# Patient Record
Sex: FEMALE | Race: WHITE | NOT HISPANIC OR LATINO | Employment: FULL TIME | URBAN - METROPOLITAN AREA
[De-identification: names, ages, dates, MRNs, and addresses within clinical notes are randomized per-mention and may not be internally consistent; named-entity substitution may affect disease eponyms.]

---

## 2017-04-14 ENCOUNTER — TELEPHONE (OUTPATIENT)
Dept: OBGYN | Age: 27
End: 2017-04-14

## 2018-09-14 LAB — CYTOLOGY CVX/VAG DOC THIN PREP: NORMAL

## 2022-02-03 ENCOUNTER — LAB SERVICES (OUTPATIENT)
Dept: LAB | Age: 32
End: 2022-02-03
Attending: FAMILY MEDICINE

## 2022-02-03 ENCOUNTER — OFFICE VISIT (OUTPATIENT)
Dept: FAMILY MEDICINE | Age: 32
End: 2022-02-03
Attending: FAMILY MEDICINE

## 2022-02-03 VITALS
HEART RATE: 89 BPM | OXYGEN SATURATION: 99 % | BODY MASS INDEX: 44.46 KG/M2 | SYSTOLIC BLOOD PRESSURE: 124 MMHG | DIASTOLIC BLOOD PRESSURE: 86 MMHG | RESPIRATION RATE: 16 BRPM | WEIGHT: 283.29 LBS | HEIGHT: 67 IN | TEMPERATURE: 96.6 F

## 2022-02-03 DIAGNOSIS — Z76.89 ESTABLISHING CARE WITH NEW DOCTOR, ENCOUNTER FOR: Primary | ICD-10-CM

## 2022-02-03 DIAGNOSIS — Z76.89 ESTABLISHING CARE WITH NEW DOCTOR, ENCOUNTER FOR: ICD-10-CM

## 2022-02-03 DIAGNOSIS — Z00.00 PHYSICAL EXAM, ANNUAL: ICD-10-CM

## 2022-02-03 LAB
ALBUMIN SERPL-MCNC: 3.8 G/DL (ref 3.6–5.1)
ALBUMIN/GLOB SERPL: 0.9 {RATIO} (ref 1–2.4)
ALP SERPL-CCNC: 67 UNITS/L (ref 45–117)
ALT SERPL-CCNC: 30 UNITS/L
ANION GAP SERPL CALC-SCNC: 8 MMOL/L (ref 10–20)
AST SERPL-CCNC: 19 UNITS/L
BILIRUB SERPL-MCNC: 0.4 MG/DL (ref 0.2–1)
BUN SERPL-MCNC: 10 MG/DL (ref 6–20)
BUN/CREAT SERPL: 11 (ref 7–25)
CALCIUM SERPL-MCNC: 9 MG/DL (ref 8.4–10.2)
CHLORIDE SERPL-SCNC: 108 MMOL/L (ref 98–107)
CHOLEST SERPL-MCNC: 154 MG/DL
CHOLEST/HDLC SERPL: 3.2 {RATIO}
CO2 SERPL-SCNC: 26 MMOL/L (ref 21–32)
CREAT SERPL-MCNC: 0.89 MG/DL (ref 0.51–0.95)
FASTING DURATION TIME PATIENT: ABNORMAL H
FASTING DURATION TIME PATIENT: ABNORMAL H
GFR SERPLBLD BASED ON 1.73 SQ M-ARVRAT: 87 ML/MIN
GLOBULIN SER-MCNC: 4.4 G/DL (ref 2–4)
GLUCOSE SERPL-MCNC: 85 MG/DL (ref 70–99)
HBA1C MFR BLD: 5.3 % (ref 4.5–5.6)
HDLC SERPL-MCNC: 48 MG/DL
LDLC SERPL CALC-MCNC: 77 MG/DL
NONHDLC SERPL-MCNC: 106 MG/DL
POTASSIUM SERPL-SCNC: 4.1 MMOL/L (ref 3.4–5.1)
PROT SERPL-MCNC: 8.2 G/DL (ref 6.4–8.2)
SODIUM SERPL-SCNC: 138 MMOL/L (ref 135–145)
TRIGL SERPL-MCNC: 145 MG/DL

## 2022-02-03 PROCEDURE — 99202 OFFICE O/P NEW SF 15 MIN: CPT

## 2022-02-03 PROCEDURE — 83036 HEMOGLOBIN GLYCOSYLATED A1C: CPT

## 2022-02-03 PROCEDURE — 84443 ASSAY THYROID STIM HORMONE: CPT

## 2022-02-03 PROCEDURE — 80061 LIPID PANEL: CPT

## 2022-02-03 PROCEDURE — 80053 COMPREHEN METABOLIC PANEL: CPT

## 2022-02-03 PROCEDURE — 99395 PREV VISIT EST AGE 18-39: CPT | Performed by: STUDENT IN AN ORGANIZED HEALTH CARE EDUCATION/TRAINING PROGRAM

## 2022-02-03 PROCEDURE — 36415 COLL VENOUS BLD VENIPUNCTURE: CPT

## 2022-02-03 ASSESSMENT — PATIENT HEALTH QUESTIONNAIRE - PHQ9
CLINICAL INTERPRETATION OF PHQ2 SCORE: NO FURTHER SCREENING NEEDED
SUM OF ALL RESPONSES TO PHQ9 QUESTIONS 1 AND 2: 0
1. LITTLE INTEREST OR PLEASURE IN DOING THINGS: NOT AT ALL
2. FEELING DOWN, DEPRESSED OR HOPELESS: NOT AT ALL
SUM OF ALL RESPONSES TO PHQ9 QUESTIONS 1 AND 2: 0

## 2022-02-04 LAB — TSH SERPL-ACNC: 1.53 MCUNITS/ML (ref 0.35–5)

## 2022-02-09 ENCOUNTER — TELEPHONE (OUTPATIENT)
Dept: TELEHEALTH | Age: 32
End: 2022-02-09

## 2022-03-08 ENCOUNTER — CLINICAL ABSTRACT (OUTPATIENT)
Dept: OTHER | Age: 32
End: 2022-03-08

## 2024-04-01 ENCOUNTER — OFFICE VISIT (OUTPATIENT)
Dept: OBGYN CLINIC | Facility: CLINIC | Age: 34
End: 2024-04-01
Payer: COMMERCIAL

## 2024-04-01 VITALS — SYSTOLIC BLOOD PRESSURE: 114 MMHG | DIASTOLIC BLOOD PRESSURE: 72 MMHG | HEART RATE: 99 BPM | WEIGHT: 276.25 LBS

## 2024-04-01 DIAGNOSIS — R87.610 ASCUS WITH POSITIVE HIGH RISK HPV CERVICAL: ICD-10-CM

## 2024-04-01 DIAGNOSIS — Z12.4 SCREENING FOR CERVICAL CANCER: ICD-10-CM

## 2024-04-01 DIAGNOSIS — N93.9 ABNORMAL UTERINE BLEEDING: Primary | ICD-10-CM

## 2024-04-01 DIAGNOSIS — N93.0 PCB (POST COITAL BLEEDING): ICD-10-CM

## 2024-04-01 DIAGNOSIS — N80.9 ENDOMETRIOSIS: ICD-10-CM

## 2024-04-01 DIAGNOSIS — R87.810 ASCUS WITH POSITIVE HIGH RISK HPV CERVICAL: ICD-10-CM

## 2024-04-01 PROCEDURE — 87624 HPV HI-RISK TYP POOLED RSLT: CPT | Performed by: OBSTETRICS & GYNECOLOGY

## 2024-04-01 RX ORDER — ALBUTEROL SULFATE 90 UG/1
AEROSOL, METERED RESPIRATORY (INHALATION)
COMMUNITY
Start: 2024-03-05

## 2024-04-01 RX ORDER — LEVONORGESTREL AND ETHINYL ESTRADIOL 100-20(84)
1 KIT ORAL DAILY
Qty: 91 TABLET | Refills: 1 | Status: SHIPPED | OUTPATIENT
Start: 2024-04-01 | End: 2025-04-01

## 2024-04-01 RX ORDER — AZELASTINE 1 MG/ML
SPRAY, METERED NASAL
COMMUNITY
Start: 2024-03-05

## 2024-04-01 RX ORDER — BENZONATATE 100 MG/1
100 CAPSULE ORAL EVERY 8 HOURS PRN
COMMUNITY
Start: 2024-03-05

## 2024-04-01 RX ORDER — OSELTAMIVIR PHOSPHATE 75 MG/1
75 CAPSULE ORAL 2 TIMES DAILY
COMMUNITY
Start: 2024-03-05

## 2024-04-01 NOTE — PROGRESS NOTES
Subjective:  Chief Complaint   Patient presents with    Physical     Annual     33 year old female  presents for annual with multiple complaints.  Heavy monthly menses.  Postcoital bleeding.  Hx of ASCUS positive HPV Pap smear last .  Had normal ultrasound last .  Gets back back pain/cramping for 1-2 weeks before menses, continues through menses.  Menses duration 9 days, heavy for 5 days, then stops for 2 days, then light bleeding for two more days.  Was on Depo Provera for 4 years with improvement in cramping and infrequent menses.  Switched to OCP and was on that until .  Again had some improvement in pain, but still present.  No plans for pregnancy at this point.  Urinary frequency but no dysuria.  Occasional pain with bowel movement.    Patient's last menstrual period was 2024.  Hx Prior Abnormal Pap: Yes  Pap Date: 23  Pap Result Notes: Atypical Squamous Cells of Undetermined  Significance (ASC-US)  Menarche: 13 (2024  1:27 PM)  Period Cycle (Days): 28-30days (2024  1:27 PM)  Period Duration (Days): 7-10days (2024  1:27 PM)  Period Flow: heavy (2024  1:27 PM)  Use of Birth Control (if yes, specify type): Condoms (2024  1:27 PM)  Hx Prior Abnormal Pap: Yes (2024  1:27 PM)  Pap Date: 23 (2024  1:27 PM)  Pap Result Notes: Atypical Squamous Cells of Undetermined  Significance (ASC-US) (2024  1:27 PM)      Last Pap smear: ASCUS pos HPV 2023          Pelvic Infections/STD: None  Contraception: as above    There is no immunization history on file for this patient.   reports that she has never smoked. She has never been exposed to tobacco smoke. She has never used smokeless tobacco.   reports current alcohol use.    Past Medical History:   Diagnosis Date    Abnormal uterine bleeding     Human papilloma virus infection      History reviewed. No pertinent surgical history.    Review of Systems:  Pertinent items are noted in the HPI.    Objective:  BP  114/72   Pulse 99   Wt 276 lb 4 oz (125.3 kg)   LMP 03/04/2024    Physical Examination:  General appearance: Well dressed, well nourished in no apparent distress  Neurologic/Psychiatric: Alert and oriented to person, place and time, mood normal, affect appropriate  Head: Normocephalic without obvious deformity, atraumatic  Neck: No thyromegaly, supple, non-tender, no masses, no adenopathy  Lungs: Clear to auscultation bilaterally, no rales, wheezes or rhonchi  Breasts: Symmetric, non-tender, no masses, lesions, retraction, dimpling or discharge bilaterally, no axillary or supraclavicular lymphadenopathy  Heart:: Regular rate and rhythm, no gallops or murmurs  Abdomen: Soft, non-tender, non-distended, no masses, no hepatosplenomegaly, no hernias, no inguinal lymphadenopathy  Pelvic:    External genitalia- Normal, Bartholin's, urethra, skeins glands normal   Vagina- No vaginal lesions, no discharge   Cervix- No lesions, long/closed, no cervical motion tenderness   Uterus- Normal sized, retroverted, non-tender, no masses   Adnexa-  Non-tender, no masses  Extremities: Non-tender, full range of motion, no clubbing, cyanosis or edema  Skin:  General inspection- no rashes, lesions or discoloration  Rectum: No hemorrhoids, no masses.    Assessment/Plan:  [1] Hx ASCUS pos HPV- check Pap/HPV  [2] Dysmenorrhea/pelvic pain/dyschezia/dyspareunia/RV uterus- all symptoms consistent with endometriosis.  Desires trial of extended cycle OCP.  May consider continuous OCP.  6 mos rx LoSeasonique with instructions and warning.  No personal or family contraindications.  Briefly reviewed option of laparoscopy or [place] if no relief with OCP.  [3] Postcoital bleeding- mild cervical ectropion.  Check repeat ultrasound.  [4] abnormal.    Declined chaperone for exam today     Diagnoses and all orders for this visit:    Abnormal uterine bleeding  -     US PELVIS W EV (CPT=76856/41582); Future    PCB (post coital bleeding)  -     US PELVIS  W EV (CPT=76856/26851); Future    Screening for cervical cancer  -     ThinPrep PAP Smear; Future  -     Hpv Dna  High Risk , Thin Prep Collect; Future    Endometriosis  -     Levonorgest-Eth Estrad 91-Day (LOSEASONIQUE) 0.1-0.02 & 0.01 MG Oral Tab; Take 1 tablet by mouth daily.    ASCUS with positive high risk HPV cervical       Return in about 6 months (around 10/1/2024) for Follow-up Visit.

## 2024-04-02 LAB — HPV I/H RISK 1 DNA SPEC QL NAA+PROBE: NEGATIVE

## 2024-04-09 LAB
.: NORMAL
.: NORMAL

## 2024-06-28 ENCOUNTER — HOSPITAL ENCOUNTER (OUTPATIENT)
Dept: ULTRASOUND IMAGING | Age: 34
Discharge: HOME | End: 2024-06-28
Attending: OBSTETRICS & GYNECOLOGY
Payer: COMMERCIAL

## 2024-06-28 DIAGNOSIS — N93.9 ABNORMAL UTERINE BLEEDING: ICD-10-CM

## 2024-06-28 DIAGNOSIS — N93.0 PCB (POST COITAL BLEEDING): ICD-10-CM

## 2024-06-28 PROCEDURE — 76830 TRANSVAGINAL US NON-OB: CPT | Performed by: OBSTETRICS & GYNECOLOGY

## 2024-06-28 PROCEDURE — 76856 US EXAM PELVIC COMPLETE: CPT | Performed by: OBSTETRICS & GYNECOLOGY

## 2024-06-30 NOTE — PROGRESS NOTES
Possible endometrial polyp.  Recommend office sonohysterogram early follicular phase, right after period finishes, ideally no later than day 10-11 of cycle.  Please notify patient and schedule.

## 2024-07-02 ENCOUNTER — TELEPHONE (OUTPATIENT)
Dept: OBGYN CLINIC | Facility: CLINIC | Age: 34
End: 2024-07-02

## 2024-07-02 NOTE — PROGRESS NOTES
Pt. called back and reviewed test results and recommendations from Dr. Pollard.  Pt. agrees to procedure, but when asked her about her period and setting up an ppt. For test to be done,  right after her period finishes, she states her periods are not regular and she she still pretty much bleeds all the time from heavy to light bleeding..Pill has not helped regulate her periods at all per pt. And she is been on this pill since 4/1/24.  Please advise. Next step. Does she need an appointment.  Thanks

## 2024-07-02 NOTE — TELEPHONE ENCOUNTER
TC to patient to discuss ultrasound results.  Patient is bleeding all the time.  Had contemplated office SHG to confirm presence of polyp, but based on severity of bleeding recommend hysteroscopy.    Recommend hysteroscopy, D&C, and possible endometrial polypectomy/myomectomy. Risks, benefits and potential complications reviewed, including anesthesia, infection with need for intravenous antibiotics, bleeding with need for blood transfusion, scarring, uterine perforation with need for hospitalization or additional surgery and fluid overload/electrolyte imbalances.  All questions answered and patient agrees to the  proposed surgery without reservation.

## 2024-07-03 ENCOUNTER — TELEPHONE (OUTPATIENT)
Dept: OBGYN CLINIC | Facility: CLINIC | Age: 34
End: 2024-07-03

## 2024-07-03 DIAGNOSIS — N93.9 ABNORMAL UTERINE BLEEDING: Primary | ICD-10-CM

## 2024-07-03 DIAGNOSIS — N84.0 ENDOMETRIAL POLYP: ICD-10-CM

## 2024-07-03 NOTE — TELEPHONE ENCOUNTER
----- Message from Constantine Pollard sent at 7/2/2024  1:19 PM CDT -----  Surgeon: Dr. Constantine Pollard    Date:     Assistant: carolyn    Type of Admit/Expected Discharge Department: Outpatient/Same Day    LOS: 0    Procedure Location: Main OR    PreOp Dx: Abnormal uterine bleeding, possible endometrial polyp    Procedure: Hysteroscopy, dilation and curettage with Truclear, possible endometrial polypectomy/myomectomy     Anticipated Time:  45 min    Anesthesia: Choice    Special Equipment/Comments:     Antibiotics: Initiate antibiotics per [facility] adult preoperative prophylactic antibiotic protocol     Pre Op Orders: Initiate my Pre-op standing orders, if none exist please use [facility]    Labs: Per [facility]    Medical clearance: No

## 2024-07-03 NOTE — TELEPHONE ENCOUNTER
Surgery scheduled for 8/8/24 1030  Post op   Future Appointments   Date Time Provider Department Center   8/26/2024  3:00 PM Constantine Pollard MD [facility] [address]     Orders entered  Added to calendar  PA - no auth needed  Reference Number  [id number]    CPT 27766

## 2024-07-29 ENCOUNTER — LABORATORY ENCOUNTER (OUTPATIENT)
Dept: LAB | Age: 34
End: 2024-07-29
Attending: OBSTETRICS & GYNECOLOGY
Payer: COMMERCIAL

## 2024-07-29 DIAGNOSIS — N93.9 ABNORMAL UTERINE BLEEDING: ICD-10-CM

## 2024-07-29 DIAGNOSIS — N84.0 ENDOMETRIAL POLYP: ICD-10-CM

## 2024-07-29 DIAGNOSIS — Z01.818 PRE-OP TESTING: ICD-10-CM

## 2024-07-29 LAB
ERYTHROCYTE [DISTWIDTH] IN BLOOD BY AUTOMATED COUNT: 13.4 %
HCT VFR BLD AUTO: 38.6 %
HGB BLD-MCNC: 12.4 G/DL
MCH RBC QN AUTO: 27.1 PG (ref 26–34)
MCHC RBC AUTO-ENTMCNC: 32.1 G/DL (ref 31–37)
MCV RBC AUTO: 84.3 FL
PLATELET # BLD AUTO: 362 10(3)UL (ref 150–450)
RBC # BLD AUTO: 4.58 X10(6)UL
WBC # BLD AUTO: 13 X10(3) UL (ref 4–11)

## 2024-07-29 PROCEDURE — 36415 COLL VENOUS BLD VENIPUNCTURE: CPT

## 2024-07-29 PROCEDURE — 85027 COMPLETE CBC AUTOMATED: CPT

## 2024-08-07 NOTE — H&P
[facility]  Obstetrics and Gynecology  Gynecologic History & Physical    Lexis Julio Patient Status:  Hospital Outpatient Surgery    1990 MRN [id number]   Location [facility] SURGERY Attending Constantine Pollard MD   Hospital Day 0 PCP No primary care provider on file.       Subjective:   Chief Complaint: Abnormal uterine bleeding and postcoital bleeding.  History of Present Illness: Patient is a 33 year old female  Patient's last menstrual period was 2024 (approximate). who presents for hysteroscopy, D&C due to abnormal uterine bleeding and post coital bleeding.  Menses monthly but heavy.    OB History    Para Term  AB Living   0 0 0 0 0 0   SAB IAB Ectopic Multiple Live Births   0 0 0 0 0     Past Medical History:    Abnormal uterine bleeding    Anxiety state    Asthma (HCC)    Attention deficit hyperactivity disorder (ADHD)    Human papilloma virus infection    PONV (postoperative nausea and vomiting)    Visual impairment    glasses prn     Past Surgical History:   Procedure Laterality Date    Cholecystectomy       Medications:  Medications Prior to Admission   Medication Sig Dispense Refill Last Dose    ibuprofen 200 MG Oral Tab Take 2-3 tablets (400-600 mg total) by mouth every 6 (six) hours as needed for Pain.   2024    Levonorgest-Eth Estrad 91-Day (LOSEASONIQUE) 0.1-0.02 & 0.01 MG Oral Tab Take 1 tablet by mouth daily. 91 tablet 1 2024 at 2100    albuterol 108 (90 Base) MCG/ACT Inhalation Aero Soln INHALE 1 TO 2 PUFFS BY MOUTH 3 TO 4 TIMES DAILY   More than a month     Allergies:  No Known Allergies     Past GYN History: As above     Social History:  Social History     Tobacco Use    Smoking status: Never     Passive exposure: Never    Smokeless tobacco: Never   Substance Use Topics    Alcohol use: Yes     Comment: socially 3 times per month      Family History: None pertinent    Review of Systems:  Pertinent items are noted in the  HPI.      Objective:   /88 (BP Location: Right arm)   Pulse 90   Temp 98.9 °F (37.2 °C) (Temporal)   Resp 18   Ht 68\"   Wt 281 lb (127.5 kg)   LMP 06/16/2024 (Approximate)   SpO2 100%   BMI 42.73 kg/m²    Physical Examination:  General appearance: Well dressed, well nourished in no apparent distress  Neurologic/Psychiatric: Alert and oriented to person, place and time, mood normal, affect appropriate  Lungs: Clear to auscultation bilaterally, no rales, wheezes or rhonchi  Heart:: Regular rate and rhythm, no gallops or murmurs  Abdomen: Soft, non-tender, non-distended, no masses  Pelvic: Deferred to OR  Extremities: No clubbing, cyanosis or edema    Diagnostics:   Ultrasound 6/28/24  PROCEDURE:  US PELVIS W EV (CPT=76856,85936)   COMPARISON:  None.   INDICATIONS:  N93.0 PCB (post coital bleeding) N93.9 Abnormal uterine bleeding   TECHNIQUE:  Pelvic ultrasound using transabdominal and endovaginal technique.  Transvaginal ultrasound was used to better evaluate adnexal and endometrial detail.   PATIENT STATED HISTORY: (As transcribed by Technologist)  Post coital bleeding and pelvic discomfort for 1-2 years.  No regular menses, comes and goes intermittently.    FINDINGS:           UTERUS:  8.14 cm x 3.76 cm x 4.25 cm    Endometrium Thickness:  7 mm    The myometrial echotexture is mildly heterogeneous.  A focal myometrial mass is not appreciated.  Subtle area of increased echogenicity in the endometrial measures 7 x 3 mm.  A polyp is possible although not certain.  Nabothian cyst is noted in the  cervix.    RIGHT OVARY:  1.5 x 1.5 x 1.9 cm.    The right ovary appears normal in size, shape, and echogenicity. No significant masses are identified.  LEFT OVARY:  2.4 x 1.6 x 1.9 cm    The left ovary appears normal in size, shape, and echogenicity. No significant masses are identified.  CUL-DE-SAC:  Normal.  No fluid or mass.    OTHER:  Negative.     Impression   CONCLUSION:    1. There is a focal area of increased  echogenicity in the endometrium measuring 7 x 3 mm. A small endometrial polyp is possible.  Hystero sonography would better evaluate the endometrial canal.  2. The myometrial echotexture is heterogeneous without a focal mass.  MRI may aid in further evaluation.  Adenomyosis is within the differential.  3. No adnexal mass or free fluid.       Assessment/Plan:   Pre-operative Diagnosis:  Abnormal uterine bleeding/post coital bleeding and possible endometrial polyp on ultrasound  Recommend hysteroscopy, D&C, and possible endometrial polypectomy/myomectomy. Risks, benefits and potential complications reviewed, including anesthesia, infection with need for intravenous antibiotics, bleeding with need for blood transfusion, scarring, uterine perforation with need for hospitalization or additional surgery and fluid overload/electrolyte imbalances.  All questions answered and patient agrees to the  proposed surgery without reservation.    Patient Active Problem List   Diagnosis    ASCUS with positive high risk HPV cervical    Endometriosis    Screening for cervical cancer    PCB (post coital bleeding)    Abnormal uterine bleeding     Risks, benefits, alternatives and possible complications have been discussed in detail with the patient.  Pre-admission, admission, and post admission procedures and expectations were discussed in detail.  All questions answered, all appropriate consents to be signed at the hospital.    Constantine Pollard MD  [facility]- Obstetrics & Gynecology

## 2024-08-08 NOTE — INTERVAL H&P NOTE
Pre-op Diagnosis: Abnormal uterine bleeding [N93.9]  Endometrial polyp [N84.0]    The above referenced H&P was reviewed by Constantine Pollard MD on 8/8/2024, the patient was examined and no significant changes have occurred in the patient's condition since the H&P was performed.  I discussed with the patient and/or legal representative the potential benefits, risks and side effects of this procedure; the likelihood of the patient achieving goals; and potential problems that might occur during recuperation.  I discussed reasonable alternatives to the procedure, including risks, benefits and side effects related to the alternatives and risks related to not receiving this procedure.  We will proceed with procedure as planned.

## 2024-08-08 NOTE — ANESTHESIA POSTPROCEDURE EVALUATION
[facility]    Lola Christiansen Patient Status:  Hospital Outpatient Surgery   Age/Gender 33 year old female MRN [id number]   Location [facility] SURGERY Attending Constantine Pollard MD   Hosp Day # 0 PCP No primary care provider on file.       Anesthesia Post-op Note    TRUCLEAR HYSTEROSCOPY, dilation and curettage, endometrial polypectomy    Procedure Summary       Date: 08/08/24 Room / Location:  MAIN OR [address] [place] [address] MAIN OR    Anesthesia Start: 1159 Anesthesia Stop: 1254    Procedure: TRUCLEAR HYSTEROSCOPY, dilation and curettage, endometrial polypectomy (Vagina ) Diagnosis:       Abnormal uterine bleeding      Endometrial polyp      (Abnormal uterine bleeding [N93.9]Endometrial polyp [N84.0])    Surgeons: Constantine Pollard MD Anesthesiologist: Karel Barragan MD    Anesthesia Type: MAC ASA Status: 3            Anesthesia Type: MAC    Vitals Value Taken Time   /97 08/08/24 1254   Temp 96.5 °F (35.8 °C) 08/08/24 1254   Pulse 67 08/08/24 1254   Resp 16 08/08/24 1254   SpO2 95 % 08/08/24 1254       Patient Location: Same Day Surgery    Anesthesia Type: MAC    Airway Patency: patent    Postop Pain Control: adequate    Mental Status: mildly sedated but able to meaningfully participate in the post-anesthesia evaluation    Nausea/Vomiting: none    Cardiopulmonary/Hydration status: stable euvolemic    Complications: no apparent anesthesia related complications    Postop vital signs: stable    Dental Exam: Unchanged from Preop    Patient to be discharged home when criteria met.

## 2024-08-08 NOTE — DISCHARGE INSTRUCTIONS
[facility] DISCHARGE INSTRUCTIONS     You have had an operation called a D&C.      What to expect:  You may be somewhat fatigued because of the anesthetic  Bleeding is usually light to moderate.  You may pass small blood clots  Light bleeding may last up to 2-3 weeks  Mild to moderate lower abdominal cramping     Activities:  Rest until tomorrow  No driving for 1-2 days  Drink plenty of fluids, but avoid alcohol  No tampons, douching or intercourse for 2-3 weeks  You may shower immediately, but avoid tub baths for 1 week    Call if you experience any of the following:  Persistent heavy bleeding  Severe pelvic pain  Temperature 101 F or higher    Follow Up:  If you do not have a postoperative appointment, please call our office at [phone] within the next few days to schedule an appointment for 2-3 weeks from now.     You received a drug called Toradol which is an Anti Inflammatory at: 12:40 pm     Do not take any Anti Inflammatory like Motrin, Advil, Aleve or Ibuprofen until after: 6:40 pm   Please report any suspected allergic reactions or bleeding issues to your doctor

## 2024-08-08 NOTE — ANESTHESIA PREPROCEDURE EVALUATION
PRE-OP EVALUATION    Patient Name: Lola Christiansen    Admit Diagnosis: Abnormal uterine bleeding [N93.9]  Endometrial polyp [N84.0]    Pre-op Diagnosis: Abnormal uterine bleeding [N93.9]  Endometrial polyp [N84.0]    TRUCLEAR HYSTEROSCOPY, dilation and curettage, possible endometrial polypectomy,myomectomy    Anesthesia Procedure: TRUCLEAR HYSTEROSCOPY, dilation and curettage, possible endometrial polypectomy,myomectomy    Surgeons and Role:     * Constantine Pollard MD - Primary    Pre-op vitals reviewed.        Body mass index is 42.57 kg/m².    Current medications reviewed.  Hospital Medications:   acetaminophen (Tylenol Extra Strength) tab 1,000 mg  1,000 mg Oral Once    scopolamine (Transderm-Scop) 1 MG/3DAYS patch 1 patch  1 patch Transdermal Once    lactated ringers infusion   Intravenous Continuous       Outpatient Medications:     Medications Prior to Admission   Medication Sig Dispense Refill Last Dose    ibuprofen 200 MG Oral Tab Take 2-3 tablets (400-600 mg total) by mouth every 6 (six) hours as needed for Pain.   7/20/2024    albuterol 108 (90 Base) MCG/ACT Inhalation Aero Soln INHALE 1 TO 2 PUFFS BY MOUTH 3 TO 4 TIMES DAILY       Levonorgest-Eth Estrad 91-Day (LOSEASONIQUE) 0.1-0.02 & 0.01 MG Oral Tab Take 1 tablet by mouth daily. 91 tablet 1        Allergies: Patient has no known allergies.      Anesthesia Evaluation    Patient summary reviewed.    Anesthetic Complications  (+) history of anesthetic complications  History of: PONV       GI/Hepatic/Renal                                 Cardiovascular                (+) obesity                                       Endo/Other                                  Pulmonary      (+) asthma                     Neuro/Psych                              ASCUS with positive high risk HPV cervical Abnormal uterine bleeding  Endometriosis PCB (post coital bleeding)  Screening for cervical cancer             Past Surgical History:   Procedure Laterality Date     Cholecystectomy       Social History     Socioeconomic History    Marital status:    Tobacco Use    Smoking status: Never     Passive exposure: Never    Smokeless tobacco: Never   Vaping Use    Vaping status: Never Used   Substance and Sexual Activity    Alcohol use: Yes     Comment: socially 3 times per month    Drug use: Yes     Types: Cannabis    Sexual activity: Yes     Partners: Male   Other Topics Concern    Caffeine Concern Yes    Exercise Yes    Seat Belt Yes     History   Drug Use    Types: Cannabis     Available pre-op labs reviewed.  Lab Results   Component Value Date    WBC 13.0 (H) 07/29/2024    RBC 4.58 07/29/2024    HGB 12.4 07/29/2024    HCT 38.6 07/29/2024    MCV 84.3 07/29/2024    MCH 27.1 07/29/2024    MCHC 32.1 07/29/2024    RDW 13.4 07/29/2024    .0 07/29/2024               Airway      Mallampati: I  Mouth opening: >3 FB  TM distance: > 6 cm  Neck ROM: full Cardiovascular    Cardiovascular exam normal.         Dental    Dentition appears grossly intact         Pulmonary    Pulmonary exam normal.                 Other findings              ASA: 3   Plan: MAC  NPO status verified and                           Present on Admission:  **None**

## 2024-08-08 NOTE — OPERATIVE REPORT
Procedure Date: 8/8/2024    Pre-Operative Diagnosis: Abnormal uterine bleeding    Post-Operative Diagnosis: Same, endometrial polyp    Procedure Performed: Hysteroscopy, dilation and curettage, endometrial polypectomy     Surgeon(s) and Role:     * Constantine Pollard MD - Primary     Assistant(s):  TIFFANY     Surgical Findings:   Normal endocervical canal.  Small endometrial polyp projecting from the left lower uterine segment.  Otherwise normal endometrial lining.  Normal tubal ostia bilaterally.    Specimen: [1] Endometrial curettings [2] Endometrial polyp     Estimated Blood Loss: 10 ml    Procedure:  After obtaining informed consent, the patient was brought into the operating room and placed on the operating room table in supine position.  MAC anesthesia was performed and the patient then placed in dorsal lithotomy position.  The perineum and vagina were then prepped and draped in the usual sterile fashion.  Bimanual examination was then performed and the uterus noted to be 8 weeks sized, retroverted.      A bivalved speculum was placed in the vagina and a single toothed tenaculum used to grasp the anterior lip of the cervix.  The uterus was sounded to 7 cm.  The cervix was then dilated to a #7 Hegar dilator.  Diagnostic hysteroscopy was then performed with the above noted findings.      The patient was noted to have a small polyp projecting from the left lower uterine segment.  Attempt first made at removal using the endometrial curet, with only partial removal.  Using the Truclear Soft Tissue Shaver Mini, the remainder of the polyp was excised down to normal myometrium. Endometrial curettage was then performed with a small amount of tissue obtained and sent to pathology for examination.     At the end of the procedure, all instruments were removed from the vagina with good hemostasis noted.  The patient was repositioned in the supine position, awakened in the operating room and transferred to the recovery room in  stable condition.  Sponge and instrument counts were correct at the end of the procedure.

## 2024-08-12 NOTE — TELEPHONE ENCOUNTER
Pt called and said she had a D & C w/ Latrice on 08/08/24 and she is still feeling very nauseas when moving around or getting up and is throwing up bile.

## 2024-08-12 NOTE — TELEPHONE ENCOUNTER
Patient had hysteroscopy, D&C with endometrial polypectomy with Dr. Pollard on 08/08 with light sedation anesthesia.     Currently experiencing a lot of nausea and vomiting, has been producing mostly bile. Has tolerated eating, is passing stool and gas, nausea seems to be associated with movement from lying to sitting/standing.     Patient states does normally get very nauseous when recovering from anesthesia, Was not sent home with an antiemetic medications.     Denies symptoms of infection including fever and chills.     Routed to provider for review.

## 2024-08-26 NOTE — PROGRESS NOTES
Subjective:  Chief Complaint   Patient presents with    Post-Op     2wk post-op      Patient presents for follow up, status post hysteroscopy, D&C and endometrial polypectomy .  Currently without complaints.  Minimal No cramping, minimal bleeding.  Denies fever or discharge.    Objective:  Physical Examination:  General appearance: Well dressed, well nourished in no apparent distress  Neurologic/Psychiatric: Alert and oriented to person, place and time, mood normal, affect appropriate  Abdomen: Soft, non-tender, non-distended  Pelvic:    External genitalia- Normal, Bartholin's, urethra, skeins glands normal   Vagina- No vaginal lesions, no discharge, scant dark red blood   Cervix- No lesions, long/closed, no cervical motion tenderness   Uterus- Normal sized, anteverted, non-tender, no masses   Adnexa-  Non-tender, no masses    Assessment/Plan:  Normal post operative examination, doing well.  Pathology results reviewed, show benign polyp and proliferative endometrium.  Patient still taking LoSeasonique, but has been bleeding since surgery.  Will take a 5-7 day break and see if bleeding improves.  Was hoping to take OCP continuously.  Discussed option of switch to Seasonique if tolerated.    Diagnoses and all orders for this visit:    Post-operative state      Return if symptoms worsen or fail to improve, for Annual Well Woman Exam.

## 2024-09-11 NOTE — TELEPHONE ENCOUNTER
Last OV: 08/26/2024  Last refill date: 04/01/2024  Follow-up: annual and prn (04/2024)   Next appt.: none     Patient requesting to increase dosage as discussed with Dr. Pollard.   Routed to provider for order placement.